# Patient Record
Sex: MALE | Race: BLACK OR AFRICAN AMERICAN | Employment: UNEMPLOYED | URBAN - METROPOLITAN AREA
[De-identification: names, ages, dates, MRNs, and addresses within clinical notes are randomized per-mention and may not be internally consistent; named-entity substitution may affect disease eponyms.]

---

## 2019-02-28 ENCOUNTER — OFFICE VISIT (OUTPATIENT)
Dept: FAMILY MEDICINE CLINIC | Age: 34
End: 2019-02-28

## 2019-02-28 ENCOUNTER — HOSPITAL ENCOUNTER (OUTPATIENT)
Dept: LAB | Age: 34
Discharge: HOME | End: 2019-02-28
Payer: SELF-PAY

## 2019-02-28 VITALS
RESPIRATION RATE: 16 BRPM | HEART RATE: 89 BPM | OXYGEN SATURATION: 95 % | SYSTOLIC BLOOD PRESSURE: 124 MMHG | DIASTOLIC BLOOD PRESSURE: 76 MMHG | TEMPERATURE: 97.6 F | BODY MASS INDEX: 26.65 KG/M2 | HEIGHT: 71 IN | WEIGHT: 190.4 LBS

## 2019-02-28 DIAGNOSIS — J30.9 CHRONIC ALLERGIC RHINITIS: ICD-10-CM

## 2019-02-28 DIAGNOSIS — F33.9 RECURRENT DEPRESSION (HCC): ICD-10-CM

## 2019-02-28 DIAGNOSIS — Z00.00 ROUTINE PHYSICAL EXAMINATION: Primary | ICD-10-CM

## 2019-02-28 DIAGNOSIS — Z00.00 ROUTINE PHYSICAL EXAMINATION: ICD-10-CM

## 2019-02-28 DIAGNOSIS — E66.3 OVERWEIGHT: ICD-10-CM

## 2019-02-28 DIAGNOSIS — I48.0 PAROXYSMAL A-FIB (HCC): ICD-10-CM

## 2019-02-28 DIAGNOSIS — Z11.3 SCREENING EXAMINATION FOR STD (SEXUALLY TRANSMITTED DISEASE): ICD-10-CM

## 2019-02-28 LAB
ALBUMIN SERPL-MCNC: 4.4 G/DL (ref 3.4–5)
ALBUMIN/GLOB SERPL: 1.4 {RATIO} (ref 0.8–1.7)
ALP SERPL-CCNC: 110 U/L (ref 45–117)
ALT SERPL-CCNC: 29 U/L (ref 16–61)
ANION GAP SERPL CALC-SCNC: 8 MMOL/L (ref 3–18)
AST SERPL-CCNC: 17 U/L (ref 15–37)
BASOPHILS # BLD: 0 K/UL (ref 0–0.1)
BASOPHILS NFR BLD: 1 % (ref 0–2)
BILIRUB SERPL-MCNC: 0.5 MG/DL (ref 0.2–1)
BUN SERPL-MCNC: 7 MG/DL (ref 7–18)
BUN/CREAT SERPL: 6 (ref 12–20)
CALCIUM SERPL-MCNC: 9.4 MG/DL (ref 8.5–10.1)
CHLORIDE SERPL-SCNC: 104 MMOL/L (ref 100–108)
CHOLEST SERPL-MCNC: 210 MG/DL
CO2 SERPL-SCNC: 30 MMOL/L (ref 21–32)
CREAT SERPL-MCNC: 1.21 MG/DL (ref 0.6–1.3)
DIFFERENTIAL METHOD BLD: ABNORMAL
EOSINOPHIL # BLD: 0.9 K/UL (ref 0–0.4)
EOSINOPHIL NFR BLD: 18 % (ref 0–5)
ERYTHROCYTE [DISTWIDTH] IN BLOOD BY AUTOMATED COUNT: 12.3 % (ref 11.6–14.5)
GLOBULIN SER CALC-MCNC: 3.2 G/DL (ref 2–4)
GLUCOSE SERPL-MCNC: 83 MG/DL (ref 74–99)
HCT VFR BLD AUTO: 48.1 % (ref 36–48)
HDLC SERPL-MCNC: 53 MG/DL (ref 40–60)
HDLC SERPL: 4 {RATIO} (ref 0–5)
HGB BLD-MCNC: 16.4 G/DL (ref 13–16)
LDLC SERPL CALC-MCNC: 118.8 MG/DL (ref 0–100)
LIPID PROFILE,FLP: ABNORMAL
LYMPHOCYTES # BLD: 2.3 K/UL (ref 0.9–3.6)
LYMPHOCYTES NFR BLD: 46 % (ref 21–52)
MCH RBC QN AUTO: 30.1 PG (ref 24–34)
MCHC RBC AUTO-ENTMCNC: 34.1 G/DL (ref 31–37)
MCV RBC AUTO: 88.4 FL (ref 74–97)
MONOCYTES # BLD: 0.4 K/UL (ref 0.05–1.2)
MONOCYTES NFR BLD: 9 % (ref 3–10)
NEUTS SEG # BLD: 1.3 K/UL (ref 1.8–8)
NEUTS SEG NFR BLD: 26 % (ref 40–73)
PLATELET # BLD AUTO: 278 K/UL (ref 135–420)
PMV BLD AUTO: 11.2 FL (ref 9.2–11.8)
POTASSIUM SERPL-SCNC: 4.5 MMOL/L (ref 3.5–5.5)
PROT SERPL-MCNC: 7.6 G/DL (ref 6.4–8.2)
RBC # BLD AUTO: 5.44 M/UL (ref 4.7–5.5)
SODIUM SERPL-SCNC: 142 MMOL/L (ref 136–145)
TRIGL SERPL-MCNC: 191 MG/DL (ref ?–150)
TSH SERPL DL<=0.05 MIU/L-ACNC: 1.92 UIU/ML (ref 0.36–3.74)
VLDLC SERPL CALC-MCNC: 38.2 MG/DL
WBC # BLD AUTO: 4.9 K/UL (ref 4.6–13.2)

## 2019-02-28 PROCEDURE — 87389 HIV-1 AG W/HIV-1&-2 AB AG IA: CPT

## 2019-02-28 PROCEDURE — 36415 COLL VENOUS BLD VENIPUNCTURE: CPT

## 2019-02-28 PROCEDURE — 80053 COMPREHEN METABOLIC PANEL: CPT

## 2019-02-28 PROCEDURE — 86780 TREPONEMA PALLIDUM: CPT

## 2019-02-28 PROCEDURE — 84443 ASSAY THYROID STIM HORMONE: CPT

## 2019-02-28 PROCEDURE — 80061 LIPID PANEL: CPT

## 2019-02-28 PROCEDURE — 85025 COMPLETE CBC W/AUTO DIFF WBC: CPT

## 2019-02-28 PROCEDURE — 87491 CHLMYD TRACH DNA AMP PROBE: CPT

## 2019-02-28 RX ORDER — CETIRIZINE HCL 10 MG
10 TABLET ORAL
Qty: 30 TAB | Refills: 2 | Status: SHIPPED | OUTPATIENT
Start: 2019-02-28

## 2019-02-28 RX ORDER — FLUTICASONE PROPIONATE 50 MCG
SPRAY, SUSPENSION (ML) NASAL
Qty: 1 BOTTLE | Refills: 0 | Status: SHIPPED | OUTPATIENT
Start: 2019-02-28

## 2019-02-28 RX ORDER — LORATADINE 10 MG/1
10 TABLET ORAL DAILY
COMMUNITY

## 2019-02-28 NOTE — PROGRESS NOTES
Patient: Antonio Giron MRN: [id number]  SSN: [id number] YOB: 1985  Age: 34 y.o. Sex: male Date of Service: 2/28/2019 Provider: WALE Ramires Office Location:  
[address], [address] [facility], [address] Office Phone: [phone] Office Fax: [phone] REASON FOR VISIT:  
Chief Complaint Patient presents with  New Patient  Complete Physical  
  severe allergies; wants to discuss VITALS:  
Visit Vitals /76 (BP 1 Location: Left arm, BP Patient Position: Sitting) Pulse 89 Temp 97.6 °F (36.4 °C) (Oral) Resp 16 Ht 5' 10.5\" (1.791 m) Wt 190 lb 6.4 oz (86.4 kg) SpO2 95% BMI 26.93 kg/m² MEDICATIONS:  
Current Outpatient Medications Medication Sig Dispense Refill  loratadine (CLARITIN) 10 mg tablet Take 10 mg by mouth daily.  lidocaine (LIDOCAINE VISCOUS) 2 % solution Take 15 mL by mouth as needed for Pain. 120 mL 0  
 guaiFENesin-codeine (CHERATUSSIN AC)  mg/5 mL solution Take 10 mL by mouth three (3) times daily as needed for Cough. 1 Bottle 0 ALLERGIES:  
No Known Allergies MEDICAL/SURGICAL HISTORY: 
Past Medical History:  
Diagnosis Date  Anxiety  Depression  Marijuana abuse  Migraine  Nicotine abuse  Paroxysmal A-fib ([address] sees cardiology annually Past Surgical History:  
Procedure Laterality Date  HX ANKLE FRACTURE TX    
 HX ORTHOPAEDIC  6/25/12  
 right shoulder disclocation; repaired by Dr. Emerald Patel  HX OTHER SURGICAL  6/27/2012  
 cardioversion FAMILY HISTORY: 
Family History Problem Relation Age of Onset  Migraines Father  Other Paternal Grandmother   
     tobacco use  Cancer Paternal Grandmother   
     lung  Cancer Paternal Grandfather   
     prostate  High Cholesterol Paternal Grandfather  Diabetes Paternal Grandfather  Hypertension Maternal Grandmother  Diabetes Maternal Grandmother SOCIAL HISTORY: 
Social History Tobacco Use  Smoking status: Current Some Day Smoker Packs/day: 0.25 Years: 5.00 Pack years: 1.25  Smokeless tobacco: Never Used  Tobacco comment: quit smoking cigarettes 8/2017 - still vapes on occ Substance Use Topics  Alcohol use: Yes Alcohol/week: 0.5 oz Types: 1 Standard drinks or equivalent per week Frequency: 2-4 times a month Drinks per session: 1 or 2 Binge frequency: Less than monthly HISTORY OF PRESENT ILLNESS:  
Salazar Tse is a 34 y.o. male who presents to the office to re-establish care. Last seen in this office by Dr. Maribeth Redd in 2013. Patient states he is here today for a routine physical. Has not had labs done in several years. His only concern today is that his allergies have been bothering him lately. States his allergies will typically flare this time of year. Patient complains of stuffy nose, clear nasal drainage, and itchy eyes. Upon review of patient's chart, it is noted that he has a history of paroxysmal a-fib in the past. He states this was first diagnosed about 6-7 years ago. He underwent a battery of cardiac tests at the time, including an exercise stress-test, and states he was told that everything was normal. He has not followed up with cardiology since. Patient's depression screening was noted to be positive on exam today. He mentions that he has been under some situational stress related to some legal issues. He states that he does not feel depressed otherwise. He is not interested in medications or counseling. Denies SI/HI. REVIEW OF SYSTEMS: 
Review of Systems Constitutional: Negative for chills, fever and malaise/fatigue. HENT: Positive for congestion. Negative for ear pain and sore throat. Eyes: Negative for blurred vision and double vision. Respiratory: Negative for cough, shortness of breath and wheezing. Cardiovascular: Negative for chest pain, palpitations and leg swelling. Gastrointestinal: Negative for abdominal pain, constipation, diarrhea, nausea and vomiting. Genitourinary: Negative for frequency and urgency. Musculoskeletal: Negative for back pain and myalgias. Neurological: Negative for dizziness and headaches. Endo/Heme/Allergies: Positive for environmental allergies. Psychiatric/Behavioral: Positive for depression ( situational, per patient). The patient is not nervous/anxious and does not have insomnia. PHYSICAL EXAMINATION: 
Physical Exam  
Constitutional: He is oriented to person, place, and time and well-developed, well-nourished, and in no distress. HENT:  
Head: Normocephalic and atraumatic. Right Ear: External ear normal.  
Left Ear: External ear normal.  
Nose: Nose normal.  
Mouth/Throat: Oropharynx is clear and moist.  
Eyes: Conjunctivae are normal. Pupils are equal, round, and reactive to light. Right eye exhibits no discharge. Left eye exhibits no discharge. Neck: Neck supple. Cardiovascular: Normal rate and normal heart sounds. An irregularly irregular rhythm present. Exam reveals no gallop and no friction rub. No murmur heard. Pulmonary/Chest: Effort normal and breath sounds normal. No stridor. He has no wheezes. He has no rales. Abdominal: Soft. Bowel sounds are normal. He exhibits no distension and no mass. There is no tenderness. There is no rebound and no guarding. Lymphadenopathy:  
  He has no cervical adenopathy. Neurological: He is alert and oriented to person, place, and time. Gait normal.  
Skin: Skin is warm and dry. No rash noted. Psychiatric: Mood, memory and affect normal.  
  
 
RESULTS: 
No results found for this visit on 02/28/19. ASSESSMENT/PLAN: 
Diagnoses and all orders for this visit: 1. Routine physical examination - Physical exam findings as detailed above - Age and gender specific counseling provided - Routine labs ordered Orders: -     METABOLIC PANEL, COMPREHENSIVE; Future -     LIPID PANEL; Future 2. Chronic allergic rhinitis - Switch to Zyrtec and Flonase Orders:   
-     fluticasone (FLONASE) 50 mcg/actuation nasal spray; Use 2 sprays each nostril daily 
-     cetirizine (ZYRTEC) 10 mg tablet; Take 1 Tab by mouth daily as needed for Allergies. 3. Paroxysmal A-fib (HCC) 
- EKG in office today confirms that patient is acutely in a-fib. He is asymptomatic so it is difficult to say how long he has been in this rhythm  
- HR and BP normal 
- We will arrange for close cardiology f/u - Check routine labs, including TSH Orders:   
-     REFERRAL TO CARDIOLOGY 
-     METABOLIC PANEL, COMPREHENSIVE; Future -     LIPID PANEL; Future 
-     TSH 3RD GENERATION; Future -     CBC WITH AUTOMATED DIFF; Future -     AMB POC EKG ROUTINE W/ 12 LEADS, INTER & REP 4. Recurrent depression ([address] - Patient declines therapy, medications or in office follow up. He was advised that he may return here at any time if he wants to talk further about his depression symptoms 5. Overweight 
- Healthy lifestyle handout included in AVS  
 
6. Screening examination for STD (sexually transmitted disease) Orders:   
-     HIV 1/2 AG/AB, 4TH GENERATION,W RFLX CONFIRM; Future -     T PALLIDUM AB; Future -     CHLAMYDIA/GC PCR; Future Follow-up Disposition: 
Return in about 1 year (around 2/28/2020) for CPE. PATIENT CARE TEAM:  
Patient Care Team: Inocencio Gutierrez MD as PCP - [facility]) Mariah Augustin MD (Cardiology) WALE Bates February 28, 2019  
12:16 PM

## 2019-02-28 NOTE — PATIENT INSTRUCTIONS
A Healthy Lifestyle: Care Instructions Your Care Instructions A healthy lifestyle can help you feel good, stay at a healthy weight, and have plenty of energy for both work and play. A healthy lifestyle is something you can share with your whole family. A healthy lifestyle also can lower your risk for serious health problems, such as high blood pressure, heart disease, and diabetes. You can follow a few steps listed below to improve your health and the health of your family. Follow-up care is a key part of your treatment and safety. Be sure to make and go to all appointments, and call your doctor if you are having problems. It's also a good idea to know your test results and keep a list of the medicines you take. How can you care for yourself at home? · Do not eat too much sugar, fat, or fast foods. You can still have dessert and treats now and then. The goal is moderation. · Start small to improve your eating habits. Pay attention to portion sizes, drink less juice and soda pop, and eat more fruits and vegetables. ? Eat a healthy amount of food. A 3-ounce serving of meat, for example, is about the size of a deck of cards. Fill the rest of your plate with vegetables and whole grains. ? Limit the amount of soda and sports drinks you have every day. Drink more water when you are thirsty. ? Eat at least 5 servings of fruits and vegetables every day. It may seem like a lot, but it is not hard to reach this goal. A serving or helping is 1 piece of fruit, 1 cup of vegetables, or 2 cups of leafy, raw vegetables. Have an apple or some carrot sticks as an afternoon snack instead of a candy bar. Try to have fruits and/or vegetables at every meal. 
· Make exercise part of your daily routine. You may want to start with simple activities, such as walking, bicycling, or slow swimming. Try to be active 30 to 60 minutes every day.  You do not need to do all 30 to 60 minutes all at once. For example, you can exercise 3 times a day for 10 or 20 minutes. Moderate exercise is safe for most people, but it is always a good idea to talk to your doctor before starting an exercise program. 
· Keep moving. [place] the lawn, work in the garden, or Invisible. Take the stairs instead of the elevator at work. · If you smoke, quit. People who smoke have an increased risk for heart attack, stroke, cancer, and other lung illnesses. Quitting is hard, but there are ways to boost your chance of quitting tobacco for good. ? Use nicotine gum, patches, or lozenges. ? Ask your doctor about stop-smoking programs and medicines. ? Keep trying. In addition to reducing your risk of diseases in the future, you will notice some benefits soon after you stop using tobacco. If you have shortness of breath or asthma symptoms, they will likely get better within a few weeks after you quit. · Limit how much alcohol you drink. Moderate amounts of alcohol (up to 2 drinks a day for men, 1 drink a day for women) are okay. But drinking too much can lead to liver problems, high blood pressure, and other health problems. Family health If you have a family, there are many things you can do together to improve your health. · Eat meals together as a family as often as possible. · Eat healthy foods. This includes fruits, vegetables, lean meats and dairy, and whole grains. · Include your family in your fitness plan. Most people think of activities such as jogging or tennis as the way to fitness, but there are many ways you and your family can be more active. Anything that makes you breathe hard and gets your heart pumping is exercise. Here are some tips: 
? Walk to do errands or to take your child to school or the bus. 
? Go for a family bike ride after dinner instead of watching TV. Where can you learn more? Go to http://margaux-zoë.info/. Enter B162 in the search box to learn more about \"A Healthy Lifestyle: Care Instructions. \" Current as of: September 11, 2018 Content Version: 11.9 © 3601-9913 [employer]. Care instructions adapted under license by [employer] (which disclaims liability or warranty for this information). If you have questions about a medical condition or this instruction, always ask your healthcare professional. [place] [address] any warranty or liability for your use of this information.

## 2019-02-28 NOTE — PROGRESS NOTES
1. Have you been to the ER, urgent care clinic since your last visit? Hospitalized since your last visit? No 
 
2. Have you seen or consulted any other health care providers outside of the [address] [place] [address] since your last visit? Include any pap smears or colon screening. No 
 
Patient declined flu vaccine. He stated he has not had a pneumonia vaccine.

## 2019-03-01 ENCOUNTER — OFFICE VISIT (OUTPATIENT)
Dept: CARDIOLOGY CLINIC | Age: 34
End: 2019-03-01

## 2019-03-01 VITALS
SYSTOLIC BLOOD PRESSURE: 120 MMHG | HEIGHT: 70 IN | BODY MASS INDEX: 28.06 KG/M2 | DIASTOLIC BLOOD PRESSURE: 84 MMHG | WEIGHT: 196 LBS | HEART RATE: 113 BPM | OXYGEN SATURATION: 98 %

## 2019-03-01 DIAGNOSIS — F17.200 TOBACCO USE DISORDER: ICD-10-CM

## 2019-03-01 DIAGNOSIS — I48.0 PAROXYSMAL A-FIB (HCC): Primary | ICD-10-CM

## 2019-03-01 LAB
C TRACH RRNA SPEC QL NAA+PROBE: NEGATIVE
HIV 1+2 AB+HIV1 P24 AG SERPL QL IA: NONREACTIVE
HIV12 RESULT COMMENT, HHIVC: NORMAL
N GONORRHOEA RRNA SPEC QL NAA+PROBE: NEGATIVE
SPECIMEN SOURCE: NORMAL
T PALLIDUM AB SER QL IA: NONREACTIVE

## 2019-03-01 RX ORDER — METOPROLOL SUCCINATE 50 MG/1
50 TABLET, EXTENDED RELEASE ORAL DAILY
Qty: 30 TAB | Refills: 6 | Status: SHIPPED | OUTPATIENT
Start: 2019-03-01

## 2019-03-01 NOTE — PROGRESS NOTES
Scott Dumas presents today for   Chief Complaint   Patient presents with    New Patient     referred by Dr. Ivy Benitez for new onset afib     Chest Pain     \"stabbing\" toward right side lasted 15 seconds one episode last week     Palpitations     \"fluttering/rapid\" on/off lasts less than a minute once or twice a month     Shortness of Breath     with and without exertion twice a week for the oast couple years     Dizziness       Lukas Samanta Larsonlles preferred language for health care discussion is english/other. Is someone accompanying this pt? No     Is the patient using any DME equipment during OV? No     Depression Screening:  3 most recent PHQ Screens 2/28/2019   Little interest or pleasure in doing things Nearly every day   Feeling down, depressed, irritable, or hopeless Nearly every day   Total Score PHQ 2 6       Learning Assessment:  Learning Assessment 2/28/2019   PRIMARY LEARNER Patient   HIGHEST LEVEL OF EDUCATION - PRIMARY LEARNER  SOME COLLEGE   BARRIERS PRIMARY LEARNER NONE   CO-LEARNER CAREGIVER No   PRIMARY LANGUAGE ENGLISH    NEED No   LEARNER PREFERENCE PRIMARY DEMONSTRATION     VIDEOS   LEARNING SPECIAL TOPICS No   ANSWERED BY Patient   RELATIONSHIP SELF       Abuse Screening:  Abuse Screening Questionnaire 2/28/2019   Do you ever feel afraid of your partner? N   Are you in a relationship with someone who physically or mentally threatens you? N   Is it safe for you to go home? Y       Fall Risk  No flowsheet data found. Pt currently taking Antiplatelet therapy? No     Coordination of Care:  1. Have you been to the ER, urgent care clinic since your last visit? Hospitalized since your last visit? No     2. Have you seen or consulted any other health care providers outside of the [address] [place] [address] since your last visit? Include any pap smears or colon screening.  NO

## 2019-03-01 NOTE — PROGRESS NOTES
HISTORY OF PRESENT ILLNESS  Declan Hendricks is a 34 y.o. male. New Patient   Associated symptoms include chest pain and shortness of breath. Pertinent negatives include no abdominal pain and no headaches. Chest Pain (Angina)    Associated symptoms include dizziness, palpitations and shortness of breath. Pertinent negatives include no abdominal pain, no claudication, no cough, no fever, no headaches, no nausea, no orthopnea, no PND and no vomiting. Palpitations    Associated symptoms include chest pain, dizziness and shortness of breath. Pertinent negatives include no fever, no claudication, no orthopnea, no PND, no abdominal pain, no nausea, no vomiting, no headaches and no cough. Shortness of Breath   Associated symptoms include chest pain. Pertinent negatives include no fever, no headaches, no cough, no wheezing, no PND, no orthopnea, no vomiting, no abdominal pain, no rash, no leg swelling and no claudication. Dizziness   Associated symptoms include chest pain and shortness of breath. Pertinent negatives include no abdominal pain and no headaches. Patient presents for a new office visit. The patient has a past medical history for paroxysmal atrial fibrillation and underwent a cardioversion in June 2012. He also underwent a trans-thoracic echocardiogram and a transesophageal echocardiogram during that hospitalization, both of which were essentially normal.    Patient has not follow-up with cardiology in over 5 years. He states he was incarcerated between 2014 and 2017. He was recently seen by his PCP earlier this week and discovered to be back in atrial fibrillation. He has been noticing worsening shortness of breath, heart palpitations and dizziness over the past few months. Unfortunately, he states he is supposed to go back to [facility] next Friday for 20 months. He denies any chace syncope or near syncope. No leg swelling, no orthopnea, no PND.   He has significantly cut back on his drinking only drinks several times a month now. He is still smoking electronic cigarettes. Past Medical History:   Diagnosis Date    Anxiety     Depression     Marijuana abuse     Migraine     Nicotine abuse     Paroxysmal A-fib [facility])     sees cardiology annually      Current Outpatient Medications   Medication Sig Dispense Refill    metoprolol succinate (TOPROL-XL) 50 mg XL tablet Take 1 Tab by mouth daily. 30 Tab 6    rivaroxaban (XARELTO) 20 mg tab tablet Take 1 Tab by mouth daily. 30 Tab 6    loratadine (CLARITIN) 10 mg tablet Take 10 mg by mouth daily.  fluticasone (FLONASE) 50 mcg/actuation nasal spray Use 2 sprays each nostril daily 1 Bottle 0    cetirizine (ZYRTEC) 10 mg tablet Take 1 Tab by mouth daily as needed for Allergies. 30 Tab 2       No Known Allergies     Social History     Tobacco Use    Smoking status: Current Some Day Smoker     Packs/day: 0.25     Years: 5.00     Pack years: 1.25    Smokeless tobacco: Never Used    Tobacco comment: quit smoking cigarettes 8/2017 - still vapes on occ   Substance Use Topics    Alcohol use: Yes     Alcohol/week: 0.5 oz     Types: 1 Standard drinks or equivalent per week     Frequency: 2-4 times a month     Drinks per session: 1 or 2     Binge frequency: Less than monthly    Drug use: No         Review of Systems   Constitutional: Negative for chills, fever and weight loss. HENT: Negative for nosebleeds. Eyes: Negative for blurred vision and double vision. Respiratory: Positive for shortness of breath. Negative for cough and wheezing. Cardiovascular: Positive for chest pain and palpitations. Negative for orthopnea, claudication, leg swelling and PND. Gastrointestinal: Negative for abdominal pain, heartburn, nausea and vomiting. Genitourinary: Negative for dysuria and hematuria. Musculoskeletal: Negative for falls and myalgias. Skin: Negative for rash. Neurological: Positive for dizziness. Negative for focal weakness and headaches. Endo/Heme/Allergies: Does not bruise/bleed easily. Psychiatric/Behavioral: Negative for substance abuse. Visit Vitals  /84   Pulse (!) 113   Ht 5' 10\" (1.778 m)   Wt 88.9 kg (196 lb)   SpO2 98%   BMI 28.12 kg/m²      Physical Exam   Constitutional: He is oriented to person, place, and time. He appears well-developed and well-nourished. HENT:   Head: Normocephalic and atraumatic. Eyes: Conjunctivae are normal.   Neck: Neck supple. No JVD present. Carotid bruit is not present. Cardiovascular: Normal rate, S1 normal, S2 normal and normal pulses. An irregularly irregular rhythm present. Exam reveals no gallop and no S3. No murmur heard. Pulmonary/Chest: He has decreased breath sounds. He has no wheezes. He has no rhonchi. He has no rales. Abdominal: Soft. Bowel sounds are normal. There is no tenderness. Musculoskeletal: He exhibits no edema. Neurological: He is alert and oriented to person, place, and time. Skin: Skin is warm and dry. February 28, 2019 EKG. Atrial fibrillation, slightly rapid rates, poor R wave progression, nonspecific T wave abnormality. Intermittent aberrant conduction. ASSESSMENT and PLAN    Paroxysmal atrial fibrillation. Patient is back in atrial fibrillation today. I suspect he may have been in atrial fibrillation for the past few months. He may benefit from a cardioversion this can be arranged once he has been incarcerated. In the meantime, I would like to start him on metoprolol XL 50 mg daily, Xarelto 20 mg daily. I was like to repeat an echocardiogram to reevaluate his LV function. Once he is appropriately anticoagulated, and elective cardioversion can be performed in about 1 month as an outpatient at [facility].    Tobacco abuse. Patient was counseling importance of smoking cessation.     Followup in 1 month, sooner if needed

## 2019-03-01 NOTE — LETTER
3/1/2019 4:40 PM 
 
Mr. Fatuma Weaver 
[address] [place] [id number] Fatuma Weaver was seen in our office on 3/1/2019 for cardiac evaluation. From a cardiac standpoint he needs to have outpatient cardioversion schedule within 1 month, if can be coordinated with [facility]. 
  
Please feel free to contact our office if you have any questions regarding this patient. Sincerely, Felipe Green MD

## 2019-03-07 ENCOUNTER — HOSPITAL ENCOUNTER (OUTPATIENT)
Dept: NON INVASIVE DIAGNOSTICS | Age: 34
Discharge: HOME | End: 2019-03-07
Attending: INTERNAL MEDICINE
Payer: SELF-PAY

## 2019-03-07 VITALS
SYSTOLIC BLOOD PRESSURE: 120 MMHG | DIASTOLIC BLOOD PRESSURE: 84 MMHG | HEIGHT: 70 IN | BODY MASS INDEX: 28.06 KG/M2 | WEIGHT: 196 LBS

## 2019-03-07 DIAGNOSIS — I48.0 PAROXYSMAL A-FIB (HCC): ICD-10-CM

## 2019-03-07 LAB
ECHO AO ROOT DIAM: 3.66 CM
ECHO LA AREA 4C: 18.3 CM2
ECHO LA VOL 2C: 72.75 ML (ref 18–58)
ECHO LA VOL 4C: 41.65 ML (ref 18–58)
ECHO LA VOL BP: 61.11 ML (ref 18–58)
ECHO LA VOL/BSA BIPLANE: 29.53 ML/M2 (ref 16–28)
ECHO LA VOLUME INDEX A2C: 35.15 ML/M2 (ref 16–28)
ECHO LA VOLUME INDEX A4C: 20.13 ML/M2 (ref 16–28)
ECHO LV INTERNAL DIMENSION DIASTOLIC: 4.59 CM (ref 4.2–5.9)
ECHO LV INTERNAL DIMENSION SYSTOLIC: 3.29 CM
ECHO LV IVSD: 1.13 CM (ref 0.6–1)
ECHO LV MASS 2D: 216 G (ref 88–224)
ECHO LV MASS INDEX 2D: 104.4 G/M2 (ref 49–115)
ECHO LV POSTERIOR WALL DIASTOLIC: 1.1 CM (ref 0.6–1)
ECHO LVOT DIAM: 2.18 CM
ECHO RV INTERNAL DIMENSION: 4.08 CM
ECHO TV REGURGITANT MAX VELOCITY: 169.38 CM/S
ECHO TV REGURGITANT PEAK GRADIENT: 11.5 MMHG

## 2019-03-07 PROCEDURE — 93306 TTE W/DOPPLER COMPLETE: CPT

## 2019-03-07 NOTE — PROGRESS NOTES
Per your last note\" Paroxysmal atrial fibrillation. Patient is back in atrial fibrillation today. I suspect he may have been in atrial fibrillation for the past few months. He may benefit from a cardioversion this can be arranged once he has been incarcerated. In the meantime, I would like to start him on metoprolol XL 50 mg daily, Xarelto 20 mg daily. I was like to repeat an echocardiogram to reevaluate his LV function.   Once he is appropriately anticoagulated, and elective cardioversion can be performed in about 1 month as an outpatient at [facility].

## 2019-03-12 ENCOUNTER — TELEPHONE (OUTPATIENT)
Dept: CARDIOLOGY CLINIC | Age: 34
End: 2019-03-12

## 2019-03-12 NOTE — LETTER
3/12/2019 4:07 PM 
 
Mr. Scott Dumas 
[address] [facility] [id number] Dear Mr. Rochasara Queenie, We have been unable to reach you by phone to notify you of your test results. Please call our office at [phone] and ask to speak with my nurse in order to explain these results to you and advise you of any recommendations. Sincerely, Venus Wynn MD

## 2019-03-12 NOTE — TELEPHONE ENCOUNTER
----- Message from Christina Isaac MD sent at 3/12/2019 11:05 AM EDT -----  Please let the patient know that his heart function is lower limits of normal by echocardiogram.  ----- Message -----  From: Heather Saeed LPN  Sent: 1/3/0236   2:24 PM  To: Christina Isaac MD    Per your last note\" Paroxysmal atrial fibrillation. Patient is back in atrial fibrillation today. I suspect he may have been in atrial fibrillation for the past few months. He may benefit from a cardioversion this can be arranged once he has been incarcerated. In the meantime, I would like to start him on metoprolol XL 50 mg daily, Xarelto 20 mg daily. I was like to repeat an echocardiogram to reevaluate his LV function.   Once he is appropriately anticoagulated, and elective cardioversion can be performed in about 1 month as an outpatient at [facility].

## 2019-03-14 ENCOUNTER — TELEPHONE (OUTPATIENT)
Dept: CARDIOLOGY CLINIC | Age: 34
End: 2019-03-14

## 2019-03-14 NOTE — TELEPHONE ENCOUNTER
----- Message from Sridevi Mayer MD sent at 3/12/2019 11:05 AM EDT -----  Please let the patient know that his heart function is lower limits of normal by echocardiogram.  ----- Message -----  From: Laurel Anderson LPN  Sent: 0/8/4087   2:24 PM  To: Sridevi Mayer MD    Per your last note\" Paroxysmal atrial fibrillation. Patient is back in atrial fibrillation today. I suspect he may have been in atrial fibrillation for the past few months. He may benefit from a cardioversion this can be arranged once he has been incarcerated. In the meantime, I would like to start him on metoprolol XL 50 mg daily, Xarelto 20 mg daily. I was like to repeat an echocardiogram to reevaluate his LV function.   Once he is appropriately anticoagulated, and elective cardioversion can be performed in about 1 month as an outpatient at [facility].

## 2019-03-14 NOTE — LETTER
3/18/2019 12:06 PM 
 
Mr. Nette Thomas [address] [place] [id number] Dear  Philipp Haines, We have been unable to reach you by phone to notify you of your test results. Please call our office at [phone] and ask to speak with my nurse in order to explain these results to you and advise you of any recommendations. Sincerely, Giuliana Davis MD

## 2019-03-22 NOTE — TELEPHONE ENCOUNTER
Patient mother returned call, verified name and , results have been fully explained to the patient's mother, who indicates understanding.